# Patient Record
Sex: FEMALE | Race: WHITE | Employment: FULL TIME | URBAN - NONMETROPOLITAN AREA
[De-identification: names, ages, dates, MRNs, and addresses within clinical notes are randomized per-mention and may not be internally consistent; named-entity substitution may affect disease eponyms.]

---

## 2019-06-15 ENCOUNTER — HOSPITAL ENCOUNTER (EMERGENCY)
Age: 44
Discharge: HOME | End: 2019-06-15
Attending: EMERGENCY MEDICINE
Payer: COMMERCIAL

## 2019-06-15 VITALS
SYSTOLIC BLOOD PRESSURE: 125 MMHG | OXYGEN SATURATION: 96 % | RESPIRATION RATE: 16 BRPM | TEMPERATURE: 98.3 F | HEART RATE: 72 BPM | DIASTOLIC BLOOD PRESSURE: 54 MMHG

## 2019-06-15 DIAGNOSIS — L02.91 ABSCESS: Primary | ICD-10-CM

## 2019-06-15 PROCEDURE — 87205 SMEAR GRAM STAIN: CPT

## 2019-06-15 PROCEDURE — 10061 I&D ABSCESS COMP/MULTIPLE: CPT

## 2019-06-15 PROCEDURE — 99283 EMERGENCY DEPT VISIT LOW MDM: CPT

## 2019-06-15 PROCEDURE — 87070 CULTURE OTHR SPECIMN AEROBIC: CPT

## 2019-06-15 PROCEDURE — 6370000000 HC RX 637 (ALT 250 FOR IP): Performed by: EMERGENCY MEDICINE

## 2019-06-15 RX ORDER — HYDROCODONE BITARTRATE AND ACETAMINOPHEN 5; 325 MG/1; MG/1
1 TABLET ORAL ONCE
Status: COMPLETED | OUTPATIENT
Start: 2019-06-15 | End: 2019-06-15

## 2019-06-15 RX ORDER — FLUOXETINE HYDROCHLORIDE 20 MG/1
80 CAPSULE ORAL DAILY
COMMUNITY
End: 2022-10-31

## 2019-06-15 RX ORDER — DOXYCYCLINE HYCLATE 100 MG/1
100 CAPSULE ORAL ONCE
Status: COMPLETED | OUTPATIENT
Start: 2019-06-15 | End: 2019-06-15

## 2019-06-15 RX ORDER — LIDOCAINE HYDROCHLORIDE 20 MG/ML
5 INJECTION, SOLUTION INFILTRATION; PERINEURAL ONCE
Status: DISCONTINUED | OUTPATIENT
Start: 2019-06-15 | End: 2019-06-15 | Stop reason: HOSPADM

## 2019-06-15 RX ORDER — HYDROCODONE BITARTRATE AND ACETAMINOPHEN 5; 325 MG/1; MG/1
1 TABLET ORAL EVERY 6 HOURS PRN
Qty: 10 TABLET | Refills: 0 | Status: SHIPPED | OUTPATIENT
Start: 2019-06-15 | End: 2019-06-18

## 2019-06-15 RX ORDER — DOXYCYCLINE 100 MG/1
100 TABLET ORAL 2 TIMES DAILY
Qty: 20 TABLET | Refills: 0 | Status: SHIPPED | OUTPATIENT
Start: 2019-06-15 | End: 2019-06-25

## 2019-06-15 RX ADMIN — DOXYCYCLINE HYCLATE 100 MG: 100 CAPSULE ORAL at 20:31

## 2019-06-15 RX ADMIN — HYDROCODONE BITARTRATE AND ACETAMINOPHEN 1 TABLET: 5; 325 TABLET ORAL at 20:31

## 2019-06-15 ASSESSMENT — PAIN DESCRIPTION - LOCATION: LOCATION: HIP

## 2019-06-15 ASSESSMENT — PAIN DESCRIPTION - ORIENTATION: ORIENTATION: LEFT

## 2019-06-15 ASSESSMENT — PAIN SCALES - GENERAL: PAINLEVEL_OUTOF10: 10

## 2019-06-15 ASSESSMENT — PAIN DESCRIPTION - PAIN TYPE: TYPE: ACUTE PAIN

## 2019-06-15 NOTE — ED PROVIDER NOTES
[facility] ED  eMERGENCY dEPARTMENT eNCOUnter      Pt Name: Uzma Valentine  MRN: [id number]  [place] 1975  Date of evaluation: 6/15/2019  Provider: Latasha Campoverde MD    CHIEF COMPLAINT     Chief Complaint   Patient presents with    Abscess     right hip abscess started a few weeks ago. became worse Friday         HISTORY OF PRESENT ILLNESS   (Location/Symptom, Timing/Onset, Context/Setting,Quality, Duration, Modifying Factors, Severity)  Note limiting factors. Uzma Valentine is a36 y.o. female who presents to the emergency department with a complaint of a skin abscess. Patient reports she started developing an abscess about 2 weeks ago. Her dad became ill and she was unable to see a doctor. She reports that is become much more swollen red and painful today. The abscess is located over her right flank. She works in a nursing home. Is never had a skin abscess before. As far she knows she has not been in contact with anyone with MRSA. No fevers or chills. No abdominal pain or vomiting. Denies diabetes or other major medical problems    HPI    Nursing Notes werereviewed. REVIEW OF SYSTEMS    (2-9 systems for level 4, 10 or more for level 5)     Review of Systems  Constitutional no fevers or chills  ENT no headache or sore throat  Cardiopulmonary no chest pain or shortness of breath  GI no abdominal pain or vomiting  Skin she has the abscess over the right flank just above the pelvis  Neurologic she denies focal weakness or trouble with her speech  Except as noted above the remainder of the review of systems was reviewed and negative. PAST MEDICAL HISTORY   No past medical history on file. SURGICALHISTORY     No past surgical history on file. CURRENT MEDICATIONS       Previous Medications    No medications on file       ALLERGIES     Amoxil [amoxicillin];  Bactrim [sulfamethoxazole-trimethoprim]; Ciprofloxacin; and Pcn [penicillins]    FAMILY HISTORY     No family extremities. DIAGNOSTIC RESULTS     EKG: All EKG's are interpreted by the Emergency Department Physician who either signs orCo-signs this chart in the absence of a cardiologist.    No EKGs indicated    RADIOLOGY:   Non-plain film images such as CT, Ultrasound and MRI are read by the radiologist. Plain radiographic images are visualized and preliminarily interpreted by the emergency physician with the below findings:    No x-rays are indicated    Interpretation per the Radiologist below, ifavailable at the time of this note:    No orders to display         ED BEDSIDE ULTRASOUND:   Performed by ED Physician - none    LABS:  Labs Reviewed   WOUND CULTURE       All other labs were within normal range ornot returned as of this dictation. EMERGENCY DEPARTMENT COURSE and DIFFERENTIAL DIAGNOSIS/MDM:   Vitals:    Vitals:    06/15/19 1946   BP: (!) 125/54   Pulse: 72   Resp: 16   Temp: 98.3 °F (36.8 °C)   TempSrc: Tympanic   SpO2: 96%       This patient presents with a fluctuant abscess and needs to be drained. I will set up a suture set with an 11 blade and 2% lidocaine. A culture will be obtained and I will do a procedure note. MDM         CONSULTS:  None    PROCEDURES: After 2% local lidocaine and Betadine prep the abscess was opened using 11 blade. There was copious amounts of purulent discharge. It was cultured. The area was irrigated and a Q-tip was used to break up the loculations. It was then packed with quarter inch iodoform gauze. Patient did tolerate the procedure well. A dressing is applied. She is advised to have the wound rechecked in 2 days and to have the packing replaced at that time. She says she has multiple allergies about the only antibiotic she can take his doxycycline. She does tell me at the end that she thinks she may have gotten this from a tanning bed and it does look like MRSA clinically.     Unlessotherwise noted below, none      Procedures    FINAL IMPRESSION    Fluctuant abscess to right flank that is now incised and drained    DISPOSITION/PLAN   DISPOSITION    Patient is discharged for close follow-up. She started on doxycycline cycling and given 10  Norcos for pain control. Franck Macdonald PATIENT REFERRED TO:  No follow-up provider specified.     DISCHARGE MEDICATIONS:  [unfilled]           (Please note that portions of this note were completed with a voice recognition program.  Efforts were made to edit the dictations but occasionally words are mis-transcribed.)      Carmelita Pruitt MD (electronically signed)  Attending Emergency Physician         Carmelita Chavez MD  06/15/19 3097

## 2019-06-17 ENCOUNTER — HOSPITAL ENCOUNTER (EMERGENCY)
Age: 44
Discharge: HOME | End: 2019-06-17
Attending: EMERGENCY MEDICINE
Payer: COMMERCIAL

## 2019-06-17 VITALS
SYSTOLIC BLOOD PRESSURE: 119 MMHG | HEART RATE: 69 BPM | TEMPERATURE: 98.3 F | DIASTOLIC BLOOD PRESSURE: 57 MMHG | OXYGEN SATURATION: 95 % | RESPIRATION RATE: 18 BRPM

## 2019-06-17 DIAGNOSIS — L02.91 ABSCESS: Primary | ICD-10-CM

## 2019-06-17 PROCEDURE — 99282 EMERGENCY DEPT VISIT SF MDM: CPT

## 2019-06-17 ASSESSMENT — PAIN DESCRIPTION - ORIENTATION: ORIENTATION: RIGHT

## 2019-06-17 ASSESSMENT — PAIN DESCRIPTION - LOCATION: LOCATION: OTHER (COMMENT)

## 2019-06-17 ASSESSMENT — PAIN DESCRIPTION - PAIN TYPE: TYPE: ACUTE PAIN

## 2019-06-17 ASSESSMENT — PAIN SCALES - GENERAL: PAINLEVEL_OUTOF10: 6

## 2019-06-18 LAB
CULTURE: NO GROWTH
DIRECT EXAM: ABNORMAL
Lab: ABNORMAL
SPECIMEN DESCRIPTION: ABNORMAL

## 2021-05-12 ENCOUNTER — HOSPITAL ENCOUNTER (OUTPATIENT)
Dept: WOMENS IMAGING | Age: 46
Discharge: HOME | End: 2021-05-14
Payer: COMMERCIAL

## 2021-05-12 DIAGNOSIS — Z12.31 ENCOUNTER FOR SCREENING MAMMOGRAM FOR MALIGNANT NEOPLASM OF BREAST: ICD-10-CM

## 2021-05-12 PROCEDURE — 77063 BREAST TOMOSYNTHESIS BI: CPT

## 2021-08-20 ENCOUNTER — HOSPITAL ENCOUNTER (EMERGENCY)
Age: 46
Discharge: HOME | End: 2021-08-20
Attending: EMERGENCY MEDICINE
Payer: COMMERCIAL

## 2021-08-20 ENCOUNTER — APPOINTMENT (OUTPATIENT)
Dept: GENERAL RADIOLOGY | Age: 46
End: 2021-08-20
Payer: COMMERCIAL

## 2021-08-20 VITALS
RESPIRATION RATE: 16 BRPM | DIASTOLIC BLOOD PRESSURE: 78 MMHG | HEART RATE: 92 BPM | OXYGEN SATURATION: 97 % | SYSTOLIC BLOOD PRESSURE: 131 MMHG | HEIGHT: 68 IN | TEMPERATURE: 97.9 F

## 2021-08-20 DIAGNOSIS — S92.501A CLOSED FRACTURE OF PHALANX OF RIGHT FIFTH TOE, INITIAL ENCOUNTER: Primary | ICD-10-CM

## 2021-08-20 PROCEDURE — 99281 EMR DPT VST MAYX REQ PHY/QHP: CPT

## 2021-08-20 PROCEDURE — 73630 X-RAY EXAM OF FOOT: CPT

## 2021-08-20 PROCEDURE — 99282 EMERGENCY DEPT VISIT SF MDM: CPT

## 2021-08-20 RX ORDER — OXYCODONE HYDROCHLORIDE AND ACETAMINOPHEN 5; 325 MG/1; MG/1
1 TABLET ORAL EVERY 6 HOURS PRN
Qty: 10 TABLET | Refills: 0 | Status: SHIPPED | OUTPATIENT
Start: 2021-08-20 | End: 2021-08-23

## 2021-08-20 ASSESSMENT — ENCOUNTER SYMPTOMS: BACK PAIN: 0

## 2021-08-20 ASSESSMENT — PAIN DESCRIPTION - LOCATION: LOCATION: FOOT

## 2021-08-20 ASSESSMENT — PAIN SCALES - GENERAL
PAINLEVEL_OUTOF10: 9
PAINLEVEL_OUTOF10: 3

## 2021-08-20 ASSESSMENT — PAIN DESCRIPTION - DESCRIPTORS: DESCRIPTORS: SHARP

## 2021-08-20 ASSESSMENT — PAIN - FUNCTIONAL ASSESSMENT: PAIN_FUNCTIONAL_ASSESSMENT: 0-10

## 2021-08-20 ASSESSMENT — PAIN DESCRIPTION - ORIENTATION: ORIENTATION: RIGHT

## 2021-08-20 ASSESSMENT — PAIN DESCRIPTION - PAIN TYPE: TYPE: ACUTE PAIN

## 2021-08-20 ASSESSMENT — PAIN DESCRIPTION - FREQUENCY: FREQUENCY: CONTINUOUS

## 2021-08-20 NOTE — ED PROVIDER NOTES
[facility] ED  EMERGENCY DEPARTMENT ENCOUNTER      Pt Greta Victoria  MRN: [id number]  Birthdate 1975  Date of evaluation: 8/20/2021  Provider: Marie Cruz MD    [address] [place] [address]     Chief Complaint   Patient presents with    Foot Pain     right; after stubbing 5th toe on cough          HISTORY OF PRESENT ILLNESS   (Location/Symptom, Timing/Onset, Context/Setting, Quality, Duration, Modifying Factors, Severity)  Note limiting factors. HPI the patient is a 58-year-old female who accidentally kicked a couch. She is experiencing level 9 pain in her right little toe. Movement of her toe makes the pain worse. Ambulation makes the pain worse. 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 The pain is sharp. She really has not taken anything for the pain. Nursing Notes were reviewed. REVIEW OF SYSTEMS    (2-9 systems for level 4, 10 or more for level 5)     Review of Systems   Constitutional: Positive for activity change. Musculoskeletal: Positive for gait problem. Negative for back pain. Skin: Negative. Neurological: Negative for dizziness, light-headedness, numbness and headaches. Psychiatric/Behavioral: Negative for confusion and decreased concentration. MEDICAL HISTORY     Past Medical History:   Diagnosis Date    Depression          SURGICAL HISTORY     No past surgical history on file. CURRENT MEDICATIONS       Previous Medications    FLUOXETINE (PROZAC) 20 MG CAPSULE    Take 80 mg by mouth daily       ALLERGIES     Amoxil [amoxicillin], Bactrim [sulfamethoxazole-trimethoprim], Ciprofloxacin, and Pcn [penicillins]    FAMILY HISTORY     No family history on file.        SOCIAL HISTORY       Social History     Socioeconomic History    Marital status:      Spouse name: Not on file    Number of children: Not on file    Years of education: Not on file    Highest education level: Not on file   Occupational History    Not on file   Tobacco Use    Smoking status: Never Smoker    Smokeless tobacco: Never Used   Vaping Use    Vaping Use: Never used   Substance and Sexual Activity    Alcohol use: Not Currently    Drug use: Never    Sexual activity: Not on file   Other Topics Concern    Not on file   Social History Narrative    Not on file     Social Determinants of Health     Financial Resource Strain:     Difficulty of Paying Living Expenses:    Food Insecurity:     Worried About Running Out of Food in the Last Year:     [address] in the Last Year:    Transportation Needs:     Lack of Transportation (Medical):  Lack of Transportation (Non-Medical):    Physical Activity:     Days of Exercise per Week:     Minutes of Exercise per Session:    Stress:     Feeling of Stress :    Social Connections:     Frequency of Communication with Friends and Family:     Frequency of Social Gatherings with Friends and Family:     Attends Yarsani Services:     Active Member of Clubs or Organizations:     Attends Club or Organization Meetings:     Marital Status:    Intimate Partner Violence:     Fear of Current or Ex-Partner:     Emotionally Abused:     Physically Abused:     Sexually Abused:        SCREENINGS             PHYSICAL EXAM  (up to 7 for level 4, 8 or more for level 5)     ED Triage Vitals [08/20/21 0920]   BP Temp Temp Source Pulse Resp SpO2 Height Weight   131/78 97.9 °F (36.6 °C) Tympanic 92 16 97 % 5' 8\" (1.727 m) --       Physical Exam  Constitutional:       Appearance: Normal appearance. She is normal weight. HENT:      Head: Normocephalic and atraumatic. Musculoskeletal:         General: Tenderness and signs of injury present. Comments: The right fifth toe is tender to any movement. There is some swelling but no ecchymosis as of yet. The rest of the foot is negative. Skin:     General: Skin is warm and dry. Neurological:      General: No focal deficit present. Mental Status: She is alert and oriented to person, place, and time. Mental status is at baseline. Psychiatric:         Mood and Affect: Mood normal.         Behavior: Behavior normal.         Thought Content: Thought content normal.         Judgment: Judgment normal.         DIAGNOSTIC RESULTS     EKG: All EKG's are interpreted by the Emergency Department Physician whoeither signs or Co-signs this chart in the absence of a cardiologist.      RADIOLOGY:   Non-plain film images such as CT, Ultrasound and MRI are read by the radiologist. Plain radiographic images are visualized and preliminarily interpreted by the emergency physician     Interpretation per the Radiologist below, if available at the time of this note:    XR FOOT RIGHT (MIN 3 VIEWS)   Preliminary Result   Acute, closed, mildly impacted extra-articular fracture of the 5th proximal   phalanx. ED BEDSIDE ULTRASOUND:   Performed by ED Physician - none    LABS:  Labs Reviewed - No data to display    EMERGENCY DEPARTMENT COURSE and DIFFERENTIAL DIAGNOSIS/MDM:   Vitals:    Vitals:    08/20/21 0920   BP: 131/78   Pulse: 92   Resp: 16   Temp: 97.9 °F (36.6 °C)   TempSrc: Tympanic   SpO2: 97%   Height: 5' 8\" (1.727 m)           MDM    CONSULTS:  None    PROCEDURES:  Unless otherwise noted below, none     Procedures    FINAL IMPRESSION      1. Closed fracture of phalanx of right fifth toe, initial encounter          DISPOSITION/PLAN   DISPOSITION Decision To Discharge 08/20/2021 10:25:51 AM      PATIENT REFERRED TO:  DO Yaw Pelaez [address] [place] [id number]  [phone]    Schedule an appointment as soon as possible for a visit         DISCHARGE MEDICATIONS:  New Prescriptions    OXYCODONE-ACETAMINOPHEN (PERCOCET) 5-325 MG PER TABLET    Take 1 tablet by mouth every 6 hours as needed for Pain for up to 3 days.               (Please note that portions ofthis note were completed with a voice recognition program.  Efforts were made to edit the dictations but occasionally words are mis-transcribed.)      Ammon Akins MD (electronically signed)  Attending Emergency Physician          Brittani Clarke MD  08/20/21 6201

## 2021-08-20 NOTE — LETTER
[facility] ED  [address] [place] [address]  Phone: [phone]  Fax: [phone]             August 20, 2021    Patient: Amaya Walker   YOB: 1975   Date of Visit: 8/20/2021       To Whom It May Concern:    Inge Gill was seen and treated in our emergency department on 8/20/2021. She may return to work on 08/23/2021.       Sincerely,             Signature:__________________________________

## 2022-05-13 ENCOUNTER — APPOINTMENT (OUTPATIENT)
Dept: VASCULAR LAB | Age: 47
End: 2022-05-13
Payer: COMMERCIAL

## 2022-05-13 ENCOUNTER — APPOINTMENT (OUTPATIENT)
Dept: GENERAL RADIOLOGY | Age: 47
End: 2022-05-13
Payer: COMMERCIAL

## 2022-05-13 ENCOUNTER — HOSPITAL ENCOUNTER (EMERGENCY)
Age: 47
Discharge: HOME | End: 2022-05-13
Payer: COMMERCIAL

## 2022-05-13 VITALS
OXYGEN SATURATION: 97 % | BODY MASS INDEX: 25.09 KG/M2 | RESPIRATION RATE: 18 BRPM | HEART RATE: 100 BPM | DIASTOLIC BLOOD PRESSURE: 76 MMHG | TEMPERATURE: 98.1 F | WEIGHT: 165 LBS | SYSTOLIC BLOOD PRESSURE: 124 MMHG

## 2022-05-13 DIAGNOSIS — M79.604 LEG PAIN, POSTERIOR, RIGHT: ICD-10-CM

## 2022-05-13 DIAGNOSIS — M25.561 ACUTE PAIN OF RIGHT KNEE: Primary | ICD-10-CM

## 2022-05-13 PROCEDURE — 99283 EMERGENCY DEPT VISIT LOW MDM: CPT

## 2022-05-13 PROCEDURE — 93971 EXTREMITY STUDY: CPT

## 2022-05-13 PROCEDURE — 73562 X-RAY EXAM OF KNEE 3: CPT

## 2022-05-13 RX ORDER — VENLAFAXINE HYDROCHLORIDE 150 MG/1
150 TABLET, EXTENDED RELEASE ORAL
COMMUNITY

## 2022-05-13 RX ORDER — LORAZEPAM 0.5 MG/1
0.5 TABLET ORAL EVERY 6 HOURS PRN
COMMUNITY

## 2022-05-13 ASSESSMENT — PAIN SCALES - GENERAL: PAINLEVEL_OUTOF10: 10

## 2022-05-13 ASSESSMENT — ENCOUNTER SYMPTOMS
EYES NEGATIVE: 1
RESPIRATORY NEGATIVE: 1
GASTROINTESTINAL NEGATIVE: 1
ALLERGIC/IMMUNOLOGIC NEGATIVE: 1

## 2022-05-13 ASSESSMENT — PAIN DESCRIPTION - LOCATION: LOCATION: LEG

## 2022-05-13 ASSESSMENT — PAIN - FUNCTIONAL ASSESSMENT: PAIN_FUNCTIONAL_ASSESSMENT: 0-10

## 2022-05-13 ASSESSMENT — PAIN DESCRIPTION - ORIENTATION: ORIENTATION: RIGHT;MID;UPPER

## 2022-05-13 NOTE — ED PROVIDER NOTES
[facility] ED  EMERGENCY DEPARTMENT ENCOUNTER      Pt Name: Anahi Bernard  MRN: [id number]  [place] 1975  Date of evaluation: 5/13/2022  Provider: Shefali Sanchez Dr       Chief Complaint   Patient presents with    Leg Pain     Right knee, radiating to upper leg. Onset 1 week ago without known injury         HISTORY OF PRESENT ILLNESS    Anahi Bernard is a 49 y.o. female who presents to the emergency department complaint of right knee and upper leg pain. She states right knee has been hurting for 4 days but then developed pain into the back of her upper right leg. Denies any past blood clots. Patient states her knee is worse with movement and better with rest.  She did take ibuprofen with some relief. 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 Denies any tingling or numbness into extremities. Patient's pain is mild to moderate. Nursing Notes were reviewed. REVIEW OF SYSTEMS    (2-9 systems for level 4, 10 or more for level 5)     Review of Systems   Constitutional: Negative. HENT: Negative. Eyes: Negative. Respiratory: Negative. Cardiovascular: Negative. Gastrointestinal: Negative. Endocrine: Negative. Genitourinary: Negative. Musculoskeletal: Positive for arthralgias. Right knee and upper leg pain. Skin: Negative. Allergic/Immunologic: Negative. Neurological: Negative. Hematological: Negative. Psychiatric/Behavioral: Negative. All other systems reviewed and are negative. Except as noted above the remainder of the review of systems was reviewed and negative. PAST MEDICAL HISTORY     Past Medical History:   Diagnosis Date    Depression          SURGICAL HISTORY     History reviewed. No pertinent surgical history.       CURRENT MEDICATIONS       Discharge Medication List as of 5/13/2022  5:35 PM      CONTINUE these medications which have NOT CHANGED    Details   venlafaxine 150 MG extended release tablet Take 150 mg by mouth daily (with breakfast)Historical Med      LORazepam (ATIVAN) 0.5 MG tablet Take 0.5 mg by mouth every 6 hours as needed for Anxiety. Historical Med      FLUoxetine (PROZAC) 20 MG capsule Take 80 mg by mouth dailyHistorical Med             ALLERGIES     Amoxil [amoxicillin], Bactrim [sulfamethoxazole-trimethoprim], Ciprofloxacin, and Pcn [penicillins]    FAMILY HISTORY     History reviewed. No pertinent family history. SOCIAL HISTORY       Social History     Socioeconomic History    Marital status:      Spouse name: None    Number of children: None    Years of education: None    Highest education level: None   Occupational History    None   Tobacco Use    Smoking status: Current Every Day Smoker     Types: E-Cigarettes    Smokeless tobacco: Never Used   Vaping Use    Vaping Use: Never used   Substance and Sexual Activity    Alcohol use: Not Currently    Drug use: Never    Sexual activity: None   Other Topics Concern    None   Social History Narrative    None     Social Determinants of Health     Financial Resource Strain:     Difficulty of Paying Living Expenses: Not on file   Food Insecurity:     Worried About Running Out of Food in the Last Year: Not on file    Sung of Food in the Last Year: Not on file   Transportation Needs:     Lack of Transportation (Medical): Not on file    Lack of Transportation (Non-Medical):  Not on file   Physical Activity:     Days of Exercise per Week: Not on file    Minutes of Exercise per Session: Not on file   Stress:     Feeling of Stress : Not on file   Social Connections:     Frequency of Communication with Friends and Family: Not on file    Frequency of Social Gatherings with Friends and Family: Not on file    Attends Jew Services: Not on file    Active Member of Clubs or Organizations: Not on file    Attends Club or Organization Meetings: Not on file    Marital Status: Not on file Intimate Partner Violence:     Fear of Current or Ex-Partner: Not on file    Emotionally Abused: Not on file    Physically Abused: Not on file    Sexually Abused: Not on file   Housing Stability:     Unable to Pay for Housing in the Last Year: Not on file    Number of [place] in the Last Year: Not on file    Unstable Housing in the Last Year: Not on file       SCREENINGS         [place] Coma Scale  Eye Opening: Spontaneous  Best Verbal Response: Oriented  Best Motor Response: Obeys commands  Jorge Coma Scale Score: 15                     CIWA Assessment  BP: 124/76  Pulse: 100                 PHYSICAL EXAM    (up to 7 for level 4, 8 or more for level 5)     ED Triage Vitals [05/13/22 1441]   BP Temp Temp src Pulse Resp SpO2 Height Weight   124/76 98.1 °F (36.7 °C) -- 100 18 97 % -- 165 lb (74.8 kg)       Physical Exam  Vitals and nursing note reviewed. Constitutional:       Appearance: Normal appearance. HENT:      Head: Normocephalic and atraumatic. Right Ear: External ear normal.      Left Ear: External ear normal.      Nose: Nose normal.      Mouth/Throat:      Mouth: Mucous membranes are moist.   Eyes:      Extraocular Movements: Extraocular movements intact. Pupils: Pupils are equal, round, and reactive to light. Cardiovascular:      Rate and Rhythm: Normal rate and regular rhythm. Pulses: Normal pulses. Pulmonary:      Effort: Pulmonary effort is normal.      Breath sounds: Normal breath sounds. Abdominal:      General: Abdomen is flat. Musculoskeletal:         General: Tenderness present. Normal range of motion. Cervical back: Normal range of motion and neck supple. Comments: Tenderness medial and lateral right knee. Varus and valgus stress stable. Anterior posterior drawer stable. Tenderness along posterior upper leg. Right hip full range of motion. Dorsal pedis radial pulse +2 bilateral symmetric. Skin:     General: Skin is warm and dry. Capillary Refill: Capillary refill takes less than 2 seconds. Neurological:      General: No focal deficit present. Mental Status: She is alert and oriented to person, place, and time. Psychiatric:         Mood and Affect: Mood normal.         Behavior: Behavior normal.         DIAGNOSTIC RESULTS     EKG: All EKG's are interpreted by the Emergency Department Physician who either signs or Co-signs this chart in the absence of a cardiologist.      RADIOLOGY:   Non-plain film images such as CT, Ultrasound and MRI are read by the radiologist. Plain radiographic images are visualized and preliminarily interpreted by the emergency physician with the below findings:      Interpretation per the Radiologist below, if available at the time of this note:    XR KNEE RIGHT (3 VIEWS)   Final Result   No acute abnormality of the knee. VL DUP LOWER EXTREMITY VENOUS RIGHT    (Results Pending)         LABS:  Labs Reviewed - No data to display    All other labs were within normal range or not returned as of this dictation. EMERGENCY DEPARTMENT COURSE and DIFFERENTIAL DIAGNOSIS/MDM:   Vitals:    Vitals:    05/13/22 1441   BP: 124/76   Pulse: 100   Resp: 18   Temp: 98.1 °F (36.7 °C)   SpO2: 97%   Weight: 165 lb (74.8 kg)           MDM  Number of Diagnoses or Management Options  Acute pain of right knee  Leg pain, posterior, right  Diagnosis management comments: Is a 40-year-old female complaining of right knee pain and right posterior upper leg pain. Patient good dorsal pedis pulse +2 bilateral symmetric. Patient had pain with movement of right knee. Ultrasound of right leg revealed no DVT. X-ray right knee revealed no acute findings. Suspect knee sprain. Varus and valgus stress stable as well as anterior posterior drawer. Ace wrap ordered for right knee. Patient was to ice, elevate, and rest right knee. Patient to continue with ibuprofen over-the-counter as needed for pain.   Patient was discharged home in stable condition. REASSESSMENT          CONSULTS:  None    PROCEDURES:  Unless otherwise noted below, none     Procedures      FINAL IMPRESSION      1. Acute pain of right knee    2. Leg pain, posterior, right          DISPOSITION/PLAN   DISPOSITION        PATIENT REFERRED TO:  Dayanara Payan DO  [address] [id number] [phone]    Schedule an appointment as soon as possible for a visit in 1 week        DISCHARGE MEDICATIONS:  Discharge Medication List as of 5/13/2022  5:35 PM        Controlled Substances Monitoring:     No flowsheet data found. (Please note that portions of this note were completed with a voice recognition program.  Efforts were made to edit the dictations but occasionally words are mis-transcribed. Torsten Sotelo (electronically signed)           Mari oAlberto Neil PA-C  05/13/22 [id number]

## 2022-07-17 ENCOUNTER — APPOINTMENT (OUTPATIENT)
Dept: GENERAL RADIOLOGY | Age: 47
End: 2022-07-17
Payer: COMMERCIAL

## 2022-07-17 ENCOUNTER — HOSPITAL ENCOUNTER (EMERGENCY)
Age: 47
Discharge: HOME | End: 2022-07-17
Attending: EMERGENCY MEDICINE
Payer: COMMERCIAL

## 2022-07-17 VITALS
BODY MASS INDEX: 25.91 KG/M2 | HEIGHT: 68 IN | RESPIRATION RATE: 16 BRPM | DIASTOLIC BLOOD PRESSURE: 80 MMHG | OXYGEN SATURATION: 97 % | HEART RATE: 95 BPM | WEIGHT: 171 LBS | SYSTOLIC BLOOD PRESSURE: 154 MMHG

## 2022-07-17 DIAGNOSIS — S61.309A NAIL AVULSION, FINGER, INITIAL ENCOUNTER: Primary | ICD-10-CM

## 2022-07-17 PROCEDURE — 99284 EMERGENCY DEPT VISIT MOD MDM: CPT

## 2022-07-17 PROCEDURE — 90471 IMMUNIZATION ADMIN: CPT | Performed by: EMERGENCY MEDICINE

## 2022-07-17 PROCEDURE — 73130 X-RAY EXAM OF HAND: CPT

## 2022-07-17 PROCEDURE — 11730 AVULSION NAIL PLATE SIMPLE 1: CPT

## 2022-07-17 PROCEDURE — 90715 TDAP VACCINE 7 YRS/> IM: CPT | Performed by: EMERGENCY MEDICINE

## 2022-07-17 PROCEDURE — 6360000002 HC RX W HCPCS: Performed by: EMERGENCY MEDICINE

## 2022-07-17 PROCEDURE — 11732 AVLSN NAIL PLATE SIMPLE EACH: CPT

## 2022-07-17 PROCEDURE — 6370000000 HC RX 637 (ALT 250 FOR IP): Performed by: EMERGENCY MEDICINE

## 2022-07-17 RX ORDER — ACETAMINOPHEN 325 MG/1
650 TABLET ORAL EVERY 6 HOURS PRN
COMMUNITY

## 2022-07-17 RX ORDER — ACETAMINOPHEN 325 MG/1
650 TABLET ORAL ONCE
Status: COMPLETED | OUTPATIENT
Start: 2022-07-17 | End: 2022-07-17

## 2022-07-17 RX ORDER — CEPHALEXIN 500 MG/1
500 CAPSULE ORAL 4 TIMES DAILY
Qty: 28 CAPSULE | Refills: 0 | Status: SHIPPED | OUTPATIENT
Start: 2022-07-17 | End: 2022-07-24

## 2022-07-17 RX ADMIN — ACETAMINOPHEN 650 MG: 325 TABLET ORAL at 13:43

## 2022-07-17 RX ADMIN — TETANUS TOXOID, REDUCED DIPHTHERIA TOXOID AND ACELLULAR PERTUSSIS VACCINE, ADSORBED 0.5 ML: 5; 2.5; 8; 8; 2.5 SUSPENSION INTRAMUSCULAR at 13:37

## 2022-07-17 ASSESSMENT — PAIN DESCRIPTION - ORIENTATION
ORIENTATION: LEFT
ORIENTATION: LEFT

## 2022-07-17 ASSESSMENT — PAIN SCALES - GENERAL
PAINLEVEL_OUTOF10: 9
PAINLEVEL_OUTOF10: 7

## 2022-07-17 ASSESSMENT — PAIN - FUNCTIONAL ASSESSMENT: PAIN_FUNCTIONAL_ASSESSMENT: 0-10

## 2022-07-17 ASSESSMENT — PAIN DESCRIPTION - DESCRIPTORS
DESCRIPTORS: THROBBING
DESCRIPTORS: ACHING;BURNING

## 2022-07-17 ASSESSMENT — ENCOUNTER SYMPTOMS
GASTROINTESTINAL NEGATIVE: 1
RESPIRATORY NEGATIVE: 1

## 2022-07-17 ASSESSMENT — PAIN DESCRIPTION - LOCATION
LOCATION: HAND
LOCATION: FINGER (COMMENT WHICH ONE)

## 2022-07-17 ASSESSMENT — PAIN DESCRIPTION - FREQUENCY: FREQUENCY: CONTINUOUS

## 2022-07-17 NOTE — LETTER
[facility] ED  [address] [place] [address]  Phone: [phone]  Fax: [phone]               July 17, 2022    Patient: Mariah Allison   YOB: 1975   Date of Visit: 7/17/2022       To Whom It May Concern:    Dami Noonna was seen and treated in our emergency department on 7/17/2022. She may return to work on 7/18/22.       Sincerely,       Sudha Mazariegos RN         Signature:__________________________________

## 2022-07-17 NOTE — Clinical Note
Trace Cortez was seen and treated in our emergency department on 7/17/2022. She may return to work on 07/17/2022. Please excuse patient from work today due to injury to her hand. May return to work tomorrow. If you have any questions or concerns, please don't hesitate to call.       Lukasz Perry, DO

## 2022-07-17 NOTE — DISCHARGE INSTRUCTIONS
Keep the wound covered at all times. Take Tylenol for pain as needed. Take the antibiotic as prescribed. Watch for signs of infection. yes

## 2022-07-17 NOTE — ED PROVIDER NOTES
EXTENDED RELEASE TABLET    Take 150 mg by mouth daily (with breakfast)    VITAMIN D PO    Take by mouth       ALLERGIES     Amoxil [amoxicillin], Bactrim [sulfamethoxazole-trimethoprim], Ciprofloxacin, and Pcn [penicillins]    FAMILY HISTORY     History reviewed. No pertinent family history. SOCIAL HISTORY       Social History     Socioeconomic History    Marital status:      Spouse name: None    Number of children: None    Years of education: None    Highest education level: None   Tobacco Use    Smoking status: Every Day     Types: E-Cigarettes    Smokeless tobacco: Never   Vaping Use    Vaping Use: Never used   Substance and Sexual Activity    Alcohol use: Not Currently    Drug use: Never           PHYSICAL EXAM    (up to 7 for level 4, 8 or more for level 5)     ED Triage Vitals [07/17/22 1246]   BP Temp Temp src Heart Rate Resp SpO2 Height Weight   (!) 154/80 -- -- 95 16 97 % 5' 8\" (1.727 m) 171 lb (77.6 kg)       Physical Exam  Constitutional:       Appearance: Normal appearance. HENT:      Head: Normocephalic and atraumatic. Eyes:      Pupils: Pupils are equal, round, and reactive to light. Cardiovascular:      Rate and Rhythm: Normal rate and regular rhythm. Pulses: Normal pulses. Pulmonary:      Effort: Pulmonary effort is normal.   Abdominal:      General: Abdomen is flat. Musculoskeletal:      Comments: 70% nail avulsion noted of the left middle finger nail. This nail was easily removed with exposures and  Nailbed. There is still approximately 30% of the nail remaining that is attached on the lateral aspect of the nailbed. No active bleeding at this time. Neurological:      Mental Status: She is alert.        DIAGNOSTIC RESULTS       RADIOLOGY:   Non-plain film images such as CT, Ultrasound and MRI are read by the radiologist. Plain radiographic images are visualized and preliminarily interpreted by the emergency physician with the below findings:      Interpretation per the Radiologist below, if available at the time of this note:    XR HAND LEFT (MIN 3 VIEWS)   Final Result   No acute osseous abnormality               EMERGENCY DEPARTMENT COURSE and DIFFERENTIAL DIAGNOSIS/MDM:   Vitals:    Vitals:    07/17/22 1246   BP: (!) 154/80   Pulse: 95   Resp: 16   SpO2: 97%   Weight: 171 lb (77.6 kg)   Height: 5' 8\" (1.727 m)       REASSESSMENT      Patient's avulsed nail was easily removed. There was nailbed exposure. Hand was soaked in Betadine and saline solution for 20 minutes. Patient's tetanus was updated. We will start her on antibiotics as well. Recommend that she follow-up with her doctor next week. Advised her to keep the nailbed covered until the nail has fully grown out. FINAL IMPRESSION      1. Nail avulsion, finger, initial encounter          DISPOSITION/PLAN   DISPOSITION Decision To Discharge 07/17/2022 01:50:47 PM      PATIENT REFERRED TO:  Kate Richardson DO  [address]  [place] [id number]  [phone]    In 1 week      DISCHARGE MEDICATIONS:  New Prescriptions    CEPHALEXIN (KEFLEX) 500 MG CAPSULE    Take 1 capsule by mouth in the morning and 1 capsule at noon and 1 capsule in the evening and 1 capsule before bedtime. Do all this for 7 days.          (Please note that portions of this note were completed with a voice recognition program.  Efforts were made to edit the dictations but occasionally words are mis-transcribed.)    Grace Robledo DO (electronically signed)  Attending Emergency Physician            Grace Robledo DO  07/17/22 6938

## 2022-07-27 ENCOUNTER — HOSPITAL ENCOUNTER (OUTPATIENT)
Dept: VASCULAR LAB | Age: 47
Discharge: HOME | End: 2022-07-29
Payer: COMMERCIAL

## 2022-07-27 ENCOUNTER — HOSPITAL ENCOUNTER (OUTPATIENT)
Age: 47
Discharge: HOME | End: 2022-07-29
Payer: COMMERCIAL

## 2022-07-27 ENCOUNTER — HOSPITAL ENCOUNTER (OUTPATIENT)
Dept: GENERAL RADIOLOGY | Age: 47
Discharge: HOME | End: 2022-07-29
Payer: COMMERCIAL

## 2022-07-27 DIAGNOSIS — R20.2 PARESTHESIA OF LOWER EXTREMITY: ICD-10-CM

## 2022-07-27 DIAGNOSIS — M79.651 RIGHT THIGH PAIN: ICD-10-CM

## 2022-07-27 DIAGNOSIS — R22.41 LUMP OF RIGHT THIGH: ICD-10-CM

## 2022-07-27 DIAGNOSIS — M25.551 BILATERAL HIP PAIN: ICD-10-CM

## 2022-07-27 DIAGNOSIS — M25.552 BILATERAL HIP PAIN: ICD-10-CM

## 2022-07-27 PROCEDURE — 73522 X-RAY EXAM HIPS BI 3-4 VIEWS: CPT

## 2022-07-27 PROCEDURE — 93971 EXTREMITY STUDY: CPT

## 2022-08-11 ENCOUNTER — TELEPHONE (OUTPATIENT)
Dept: GASTROENTEROLOGY | Age: 47
End: 2022-08-11

## 2022-08-11 RX ORDER — POLYETHYLENE GLYCOL 3350, SODIUM SULFATE ANHYDROUS, SODIUM BICARBONATE, SODIUM CHLORIDE, POTASSIUM CHLORIDE 236; 22.74; 6.74; 5.86; 2.97 G/4L; G/4L; G/4L; G/4L; G/4L
4 POWDER, FOR SOLUTION ORAL ONCE
Qty: 4000 ML | Refills: 0 | Status: SHIPPED | OUTPATIENT
Start: 2022-08-11 | End: 2022-08-11

## 2022-08-11 NOTE — TELEPHONE ENCOUNTER
Contacted patient. Direct Access Colonoscopy questions completed:       1. Is the patient's age greater than or equal to 68 years? No     2. Is the patient's BMI greater than or equal to 44. 9? No     3. Does the patient have any significant or new GI symptoms? No     4. Does the patient have any significant medical illness/conditions? (heart,lung,renal,liver)  No     5. Does the patient have an AICD/Cardiac Defibrillator? No     6. Is the patient on anti-thrombotic (I.e. Coumadin) or anti-platelet (I.e. Plavix) medication? No     7. Is the patient on home oxygen or use a Bi-Pap? No     8. Does the patient have insulin dependent diabetes? No     9. Does the patient require an ? No     10. Does the patient have a history of neck radiation or radical neck surgery? No     11. Is the patient on dialysis? No     12. Does the patient have any major cognitive impairments? No     13. Does the patient have Family history of colon cancer? No     14. When was patient's last colonoscopy? NA - will be first colon screening      Scheduled for October 12th for colonoscopy  Prep to go to [facility]  Verified mailing address, mailed prep instructions.

## 2022-08-19 ENCOUNTER — HOSPITAL ENCOUNTER (OUTPATIENT)
Dept: CT IMAGING | Age: 47
Discharge: HOME | End: 2022-08-21
Payer: COMMERCIAL

## 2022-08-19 DIAGNOSIS — R20.2 PARESTHESIA OF LOWER EXTREMITY: ICD-10-CM

## 2022-08-19 DIAGNOSIS — M25.551 RIGHT HIP PAIN: ICD-10-CM

## 2022-08-19 DIAGNOSIS — M79.651 RIGHT THIGH PAIN: ICD-10-CM

## 2022-08-19 PROCEDURE — 73700 CT LOWER EXTREMITY W/O DYE: CPT

## 2022-08-19 PROCEDURE — 72131 CT LUMBAR SPINE W/O DYE: CPT

## 2022-09-14 ENCOUNTER — HOSPITAL ENCOUNTER (OUTPATIENT)
Dept: CT IMAGING | Age: 47
Discharge: HOME | End: 2022-09-16
Payer: COMMERCIAL

## 2022-09-14 DIAGNOSIS — K63.9 LESION OF COLON: ICD-10-CM

## 2022-09-14 DIAGNOSIS — K57.90 DIVERTICULOSIS: ICD-10-CM

## 2022-09-14 PROCEDURE — 74177 CT ABD & PELVIS W/CONTRAST: CPT

## 2022-09-14 PROCEDURE — 6360000004 HC RX CONTRAST MEDICATION

## 2022-09-14 RX ADMIN — IOPAMIDOL 18 ML: 755 INJECTION, SOLUTION INTRAVENOUS at 13:09

## 2022-09-14 RX ADMIN — IOPAMIDOL 75 ML: 755 INJECTION, SOLUTION INTRAVENOUS at 13:09

## 2022-10-31 ENCOUNTER — HOSPITAL ENCOUNTER (OUTPATIENT)
Age: 47
Setting detail: SPECIMEN
Discharge: HOME | End: 2022-10-31
Payer: COMMERCIAL

## 2022-10-31 ENCOUNTER — OFFICE VISIT (OUTPATIENT)
Dept: OBGYN | Age: 47
End: 2022-10-31
Payer: COMMERCIAL

## 2022-10-31 VITALS
WEIGHT: 172 LBS | BODY MASS INDEX: 26.07 KG/M2 | HEIGHT: 68 IN | DIASTOLIC BLOOD PRESSURE: 68 MMHG | SYSTOLIC BLOOD PRESSURE: 118 MMHG

## 2022-10-31 DIAGNOSIS — Z01.419 WOMEN'S ANNUAL ROUTINE GYNECOLOGICAL EXAMINATION: Primary | ICD-10-CM

## 2022-10-31 DIAGNOSIS — K57.90 DIVERTICULOSIS: ICD-10-CM

## 2022-10-31 DIAGNOSIS — Z01.419 WOMEN'S ANNUAL ROUTINE GYNECOLOGICAL EXAMINATION: ICD-10-CM

## 2022-10-31 DIAGNOSIS — Z12.31 SCREENING MAMMOGRAM, ENCOUNTER FOR: ICD-10-CM

## 2022-10-31 PROCEDURE — 87624 HPV HI-RISK TYP POOLED RSLT: CPT

## 2022-10-31 PROCEDURE — 99386 PREV VISIT NEW AGE 40-64: CPT | Performed by: OBSTETRICS & GYNECOLOGY

## 2022-10-31 PROCEDURE — G0145 SCR C/V CYTO,THINLAYER,RESCR: HCPCS

## 2022-10-31 RX ORDER — METRONIDAZOLE 500 MG/1
500 TABLET ORAL 2 TIMES DAILY
Qty: 20 TABLET | Refills: 0 | Status: SHIPPED | OUTPATIENT
Start: 2022-10-31 | End: 2022-11-10

## 2022-10-31 RX ORDER — SUMATRIPTAN 25 MG/1
TABLET, FILM COATED ORAL
COMMUNITY
Start: 2022-10-06

## 2022-10-31 RX ORDER — ONDANSETRON 4 MG/1
4 TABLET, ORALLY DISINTEGRATING ORAL EVERY 8 HOURS PRN
COMMUNITY
Start: 2022-10-10 | End: 2022-10-31

## 2022-10-31 RX ORDER — CHOLECALCIFEROL (VITAMIN D3) 50 MCG
TABLET ORAL
COMMUNITY
Start: 2022-10-25

## 2022-10-31 RX ORDER — FLUTICASONE PROPIONATE 50 MCG
SPRAY, SUSPENSION (ML) NASAL
COMMUNITY
Start: 2022-10-25

## 2022-10-31 NOTE — PROGRESS NOTES
YEARLY PHYSICAL    Date of service: 10/31/2022    Lily Turpin  Is a 49 y.o.   female    PT's PCP is: Andrade Guo DO     : 1975                                             Subjective:       No LMP recorded. Patient has had a hysterectomy. Are your menses regular: not applicable    OB History    Para Term  AB Living   1 1 1         SAB IAB Ectopic Molar Multiple Live Births                    # Outcome Date GA Lbr Jacob/2nd Weight Sex Delivery Anes PTL Lv   1 Term      CS-Classical           Social History     Tobacco Use   Smoking Status Every Day    Types: E-Cigarettes   Smokeless Tobacco Never        Social History     Substance and Sexual Activity   Alcohol Use Not Currently       Family History   Problem Relation Age of Onset    Cancer Mother         Breast    Cancer Paternal Grandmother         Breast    Cancer Paternal Aunt         Breast       Allergies: Amoxil [amoxicillin], Bactrim [sulfamethoxazole-trimethoprim], Ciprofloxacin, and Pcn [penicillins]      Current Outpatient Medications:     vitamin D (CHOLECALCIFEROL) 50 MCG (2000 UT) TABS tablet, take 1 tablet by mouth once daily, Disp: , Rfl:     fluticasone (FLONASE) 50 MCG/ACT nasal spray, instill 1 spray into each nostril twice a day for 30 DAYS, Disp: , Rfl:     SUMAtriptan (IMITREX) 25 MG tablet, take 1 tablet by mouth twice a day (AT LEAST 2 HOURS BETWEEN DOSES), Disp: , Rfl:     acetaminophen (TYLENOL) 325 MG tablet, Take 650 mg by mouth every 6 hours as needed for Pain, Disp: , Rfl:     venlafaxine 150 MG extended release tablet, Take 150 mg by mouth daily (with breakfast), Disp: , Rfl:     LORazepam (ATIVAN) 0.5 MG tablet, Take 0.5 mg by mouth every 6 hours as needed for Anxiety. , Disp: , Rfl:     ondansetron (ZOFRAN-ODT) 4 MG disintegrating tablet, Take 4 mg by mouth every 8 hours as needed (Patient not taking: Reported on 10/31/2022), Disp: , Rfl:     Social History     Substance and Sexual Activity   Sexual Activity Yes    Partners: Male    Birth control/protection: Surgical       Any bleeding or pain with intercourse: Yes-Pain at times    Last Yearly:  2011    Last pap: 2011    Last HPV: unknown    Last Mammogram: 5/25/21    Last Leonides Walker never    Do you do self breast exams: No    Past Medical History:   Diagnosis Date    Depression        Past Surgical History:   Procedure Laterality Date    CHOLECYSTECTOMY      HYSTERECTOMY (CERVIX STATUS UNKNOWN)         Family History   Problem Relation Age of Onset    Cancer Mother         Breast    Cancer Paternal Grandmother         Breast    Cancer Paternal Aunt         Breast       Any family history of breast or ovarian cancer: Yes-Mother,Mgma, Maunt-Breast    Any family history of blood clots: No      Chief Complaint   Patient presents with    Abdominal Pain     NP: Pt is here today for pelvic and abdominal pain that has been on going for aprox 3 months, it is consistent staying the same pain wise. Menopause     Pt believes she is going through menopause and would like to discuss her hot flashes and overly sweating. These symptoms started aprox 2-3 months ago. Gynecologic Exam     Pt here for yearly pap previous pap was 11 years ago. Nurse: LMD  PE:  Vital Signs  Blood pressure 118/68, height 5' 8\" (1.727 m), weight 172 lb (78 kg). Labs:    No results found for this visit on 10/31/22. HPI: The patient is here today for a yearly exam.  I have reviewed her chart and seen she has had 2 CT scans in the last few months. Both have shown a significant degree of diverticular disease and no other obvious findings.   The patient has had persistent left lower quadrant pain for 3 months    YesPT denies fever, chills, nausea and vomiting       Objective  Lymphatic:   no lymphadenopathy  Heent:   negative   Cor: regular rate and rhythm, no murmurs              Pul:clear to auscultation bilaterally- no wheezes, rales or rhonchi, normal air movement, no respiratory distress      GI: Abdomen soft, *there is mild tenderness to deep palpation in the left lower quadrant. BS normal. No masses,  No organomegaly, old well-healed scars on lower abdomen           Extremities: normal strength, tone, and muscle mass   Breasts: Breast:normal appearance, no masses or tenderness, Inspection negative, No nipple retraction or dimpling, No nipple discharge or bleeding, No axillary or supraclavicular adenopathy, Normal to palpation without dominant masses   Pelvic Exam: GENITAL/URINARY:  External Genitalia:  General appearance; normal, Hair distribution; normal, Lesions absent  Vagina:  General appearance normal, Estrogen effect normal, Discharge absent, Lesions absent, Pelvic support normal  Uterus:  Hystory of hysterectomy  Adenexa: Masses absent, Tenderness absent, Enlargement absent, Nodularity absent                                      Vaginal discharge: no vaginal discharge              Over 50% of time spent on counseling and care coordination on: see assessment and plan                        Assessment and Plan: As the patient is having persistent left lower quadrant pain with diverticular disease I will give her a trial of treatment with Flagyl to see if this will help improve this pain. Also will have her use Motrin or Aleve help as well. I did recommend a diverticulosis diet, avoid constipation and to be aware if she ever develops severe left lower quadrant pain and fever to bring this to to her doctor's attention emergently        Diagnosis Orders   1. Women's annual routine gynecological examination  PAP SMEAR      2. Screening mammogram, encounter for  [facility] [place] DIGITAL SCREEN BILATERAL      3. Diverticulosis            I have discontinued Dayanna Salvador's FLUoxetine, VITAMIN D PO, and ondansetron.  I am also having her maintain her venlafaxine, LORazepam, acetaminophen, vitamin D, fluticasone, and SUMAtriptan.

## 2022-11-04 LAB — CYTOLOGY REPORT: NORMAL

## 2022-11-08 LAB
HPV SAMPLE: NORMAL
HPV, GENOTYPE 16: NOT DETECTED
HPV, GENOTYPE 18: NOT DETECTED
HPV, HIGH RISK OTHER: NOT DETECTED
HPV, INTERPRETATION: NORMAL
SPECIMEN DESCRIPTION: NORMAL

## 2022-11-09 ENCOUNTER — HOSPITAL ENCOUNTER (OUTPATIENT)
Age: 47
Setting detail: SPECIMEN
Discharge: HOME | End: 2022-11-09
Payer: COMMERCIAL

## 2022-11-09 ENCOUNTER — PROCEDURE VISIT (OUTPATIENT)
Dept: OBGYN | Age: 47
End: 2022-11-09
Payer: COMMERCIAL

## 2022-11-09 VITALS
HEIGHT: 68 IN | WEIGHT: 176 LBS | DIASTOLIC BLOOD PRESSURE: 76 MMHG | SYSTOLIC BLOOD PRESSURE: 122 MMHG | BODY MASS INDEX: 26.67 KG/M2

## 2022-11-09 DIAGNOSIS — N89.0 VAGINAL INTRAEPITHELIAL NEOPLASIA I (VAIN I): Primary | ICD-10-CM

## 2022-11-09 DIAGNOSIS — N89.0 VAGINAL INTRAEPITHELIAL NEOPLASIA I (VAIN I): ICD-10-CM

## 2022-11-09 PROCEDURE — 88342 IMHCHEM/IMCYTCHM 1ST ANTB: CPT

## 2022-11-09 PROCEDURE — 57420 EXAM OF VAGINA W/SCOPE: CPT | Performed by: OBSTETRICS & GYNECOLOGY

## 2022-11-09 PROCEDURE — 88305 TISSUE EXAM BY PATHOLOGIST: CPT

## 2022-11-09 NOTE — PROGRESS NOTES
colposcopy for VAI N1, cannot rule out high-grade lesion    Colposcopy of vulva is negative for any lesions. Vaginal colposcopy thoroughly performed. In the central portion of the cuff there is an area of acetowhite changes that also has some mild inflammation. This area was biopsied with biopsy forceps. A small amount of silver nitrate was used to stop the bleeding.       Assessment/plan: No evidence of gross malignancy suspect some degree of dysplasia, will await biopsy results

## 2022-11-14 LAB — SURGICAL PATHOLOGY REPORT: NORMAL

## 2024-02-01 ENCOUNTER — TELEPHONE (OUTPATIENT)
Dept: OBGYN | Age: 49
End: 2024-02-01

## 2024-02-01 NOTE — TELEPHONE ENCOUNTER
Pt needs 6 month pap, I attempted to contact pt with no success via phone. Certified letter sent due to number of no shows and importance of testing.

## 2024-06-10 NOTE — ED PROVIDER NOTES
Chief Complaint   Patient presents with    Derm Problem     The patient reports the site has healed well. The patient reports some intermittent sharp pain in the area of the scar that they would like to discuss.      Yane Flores LPN     [facility] ED  eMERGENCY dEPARTMENT eNCOUnter      Pt Name: Uzma Valentine  MRN: [id number]  [place] 1975  Date of evaluation: 6/17/2019  Provider: Latasha Campoverde MD    CHIEF COMPLAINT     Chief Complaint   Patient presents with    Wound Check     told by Dr. Roxanna Armas to return today for wound recheck         HISTORY OF PRESENT ILLNESS   (Location/Symptom, Timing/Onset, Context/Setting,Quality, Duration, Modifying Factors, Severity)  Note limiting factors. Uzma Valentine is a36 y.o. female who presents to the emergency department repacking of an abscess that she had opened 2 days ago. Patient was seen by myself on Saturday and had a very large purulent abscess that was drained and packed with ER from gauze. Because she could get in and see her family doctor she returns here for recheck and repacking. She reports is  but is getting better. No fevers or chills no systemic signs. HPI    Nursing Notes werereviewed. REVIEW OF SYSTEMS    (2-9 systems for level 4, 10 or more for level 5)     Review of Systems  Constitutional no fevers or chills  ENT no headache or sore throat  Skin she has a abscess to the right flank area but it does look less swollen and red today. The packing is still in place. Except as noted above the remainder of the review of systems was reviewed and negative. PAST MEDICAL HISTORY     Past Medical History:   Diagnosis Date    Depression          SURGICALHISTORY     History reviewed. No pertinent surgical history. CURRENT MEDICATIONS       Previous Medications    DOXYCYCLINE MONOHYDRATE (ADOXA) 100 MG TABLET    Take 1 tablet by mouth 2 times daily for 10 days    FLUOXETINE (PROZAC) 20 MG CAPSULE    Take 80 mg by mouth daily    HYDROCODONE-ACETAMINOPHEN (NORCO) 5-325 MG PER TABLET    Take 1 tablet by mouth every 6 hours as needed for Pain for up to 3 days. ALLERGIES     Amoxil [amoxicillin];  Bactrim [sulfamethoxazole-trimethoprim]; Ciprofloxacin; and Pcn [penicillins]    FAMILY HISTORY     History reviewed. No pertinent family history. SOCIAL HISTORY       Social History     Socioeconomic History    Marital status:      Spouse name: None    Number of children: None    Years of education: None    Highest education level: None   Occupational History    None   Social Needs    Financial resource strain: None    Food insecurity:     Worry: None     Inability: None    Transportation needs:     Medical: None     Non-medical: None   Tobacco Use    Smoking status: Never Smoker    Smokeless tobacco: Never Used   Substance and Sexual Activity    Alcohol use: Not Currently    Drug use: Never    Sexual activity: None   Lifestyle    Physical activity:     Days per week: None     Minutes per session: None    Stress: None   Relationships    Social connections:     Talks on phone: None     Gets together: None     Attends Taoism service: None     Active member of club or organization: None     Attends meetings of clubs or organizations: None     Relationship status: None    Intimate partner violence:     Fear of current or ex partner: None     Emotionally abused: None     Physically abused: None     Forced sexual activity: None   Other Topics Concern    None   Social History Narrative    None       SCREENINGS      @FLOW([id number])@      PHYSICAL EXAM    (up to 7 for level 4, 8 or more for level 5)     ED Triage Vitals [06/17/19 1422]   BP Temp Temp Source Pulse Resp SpO2 Height Weight   (!) 119/57 98.3 °F (36.8 °C) Oral 69 18 95 % -- --       Physical Exam  Pleasant white female who is afebrile with stable vitals. Her abscess to the right side of her flank and abdomen demonstrates the packing is in place. There is no cellulitis. No active draining. It does look like it is healing.   DIAGNOSTIC RESULTS     EKG: All EKG's are interpreted by the Emergency Department Physician who either signs orCo-signs this chart in the absence of a cardiologist.    No EKG is indicated    RADIOLOGY:   Non-plain film images such as CT, Ultrasound and MRI are read by the radiologist. Plain radiographic images are visualized and preliminarily interpreted by the emergency physician with the below findings:    No x-rays are indicated    Interpretation per the Radiologist below, ifavailable at the time of this note:    No orders to display         ED BEDSIDE ULTRASOUND:   Performed by ED Physician - none    LABS:  Labs Reviewed - No data to display    All other labs were within normal range ornot returned as of this dictation. EMERGENCY DEPARTMENT COURSE and DIFFERENTIAL DIAGNOSIS/MDM:   Vitals:    Vitals:    06/17/19 1422   BP: (!) 119/57   Pulse: 69   Resp: 18   Temp: 98.3 °F (36.8 °C)   TempSrc: Oral   SpO2: 95%       This patient had the packing removed by myself. I cleansed it with Betadine and irrigated it. It was then repacked with iodoform quarter-inch gauze. I did give her the bottle so she could have it repacked by family doctor on Wednesday. She is to continue with the doxycycline. I did look up her culture result which showed gram-positive cocci in clusters but there is no sensitivity back at this point. MDM     CRITICAL CARE TIME       CONSULTS:  None    PROCEDURES:  Unlessotherwise noted below, none      Procedures    FINAL IMPRESSION    Abscess to right flank which is improving and repacked with gauze    DISPOSITION/PLAN   DISPOSITION        PATIENT REFERRED TO:  No follow-up provider specified.     DISCHARGE MEDICATIONS:  [unfilled]           (Please note that portions of this note were completed with a voice recognition program.  Efforts were made to edit the dictations but occasionally words are mis-transcribed.)      Darlyn Vicente MD (electronically signed)  Attending Emergency Physician         Darlyn Vicente., MD  06/17/19 3349